# Patient Record
Sex: FEMALE | URBAN - METROPOLITAN AREA
[De-identification: names, ages, dates, MRNs, and addresses within clinical notes are randomized per-mention and may not be internally consistent; named-entity substitution may affect disease eponyms.]

---

## 2018-05-04 ENCOUNTER — OFFICE VISIT (OUTPATIENT)
Dept: OPHTHALMOLOGY | Facility: CLINIC | Age: 48
End: 2018-05-04
Payer: COMMERCIAL

## 2018-05-04 DIAGNOSIS — H52.13 MYOPIA, BILATERAL: Primary | ICD-10-CM

## 2018-05-04 DIAGNOSIS — G93.2 PSEUDOTUMOR CEREBRI: ICD-10-CM

## 2018-05-04 DIAGNOSIS — H47.10 EDEMA OF OPTIC NERVE: ICD-10-CM

## 2018-05-04 DIAGNOSIS — E11.9 TYPE 2 DIABETES MELLITUS WITHOUT OPHTHALMIC MANIFESTATIONS (H): ICD-10-CM

## 2018-05-04 RX ORDER — ATENOLOL 25 MG/1
25 TABLET ORAL DAILY
COMMUNITY
Start: 2016-12-23

## 2018-05-04 RX ORDER — ACETAZOLAMIDE 500 MG/1
500 CAPSULE, EXTENDED RELEASE ORAL DAILY
COMMUNITY
Start: 2016-11-11

## 2018-05-04 RX ORDER — BUPROPION HYDROCHLORIDE 300 MG/1
300 TABLET ORAL DAILY
COMMUNITY
Start: 2016-12-23 | End: 2018-07-20

## 2018-05-04 ASSESSMENT — CUP TO DISC RATIO
OS_RATIO: 0.1
OD_RATIO: 0.1

## 2018-05-04 ASSESSMENT — SLIT LAMP EXAM - LIDS
COMMENTS: NORMAL
COMMENTS: NORMAL

## 2018-05-04 ASSESSMENT — VISUAL ACUITY
OD_SC: 20/20
METHOD: SNELLEN - LINEAR
OS_SC: 20/30
OS_SC+: -2/+2
OS_SC: J5
OD_SC: J2

## 2018-05-04 ASSESSMENT — REFRACTION_MANIFEST
OS_SPHERE: -1.00
OD_SPHERE: -1.00
OD_ADD: +1.75
OS_CYLINDER: +1.00
OS_AXIS: 095
OS_ADD: +1.75
OD_CYLINDER: +0.75
OD_AXIS: 085

## 2018-05-04 ASSESSMENT — TONOMETRY
IOP_METHOD: APPLANATION
OD_IOP_MMHG: 16
OS_IOP_MMHG: 18

## 2018-05-04 ASSESSMENT — EXTERNAL EXAM - RIGHT EYE: OD_EXAM: NORMAL

## 2018-05-04 ASSESSMENT — CONF VISUAL FIELD
OD_NORMAL: 1
OS_NORMAL: 1
METHOD: COUNTING FINGERS

## 2018-05-04 ASSESSMENT — EXTERNAL EXAM - LEFT EYE: OS_EXAM: NORMAL

## 2018-05-04 NOTE — MR AVS SNAPSHOT
After Visit Summary   2018    Maritza Corral    MRN: [id number]           Patient Information     Date Of Birth          1970        Visit Information        Provider Department      2018 10:40 AM Lisa England MD [facility]        Today's Diagnoses     Myopia, bilateral - Both Eyes    -  1    Pseudotumor cerebri - Left Eye        Type 2 diabetes mellitus without ophthalmic manifestations (H) - Both Eyes           Follow-ups after your visit        Follow-up notes from your care team     Return in about 2 months (around 2018) for Follow Up- pseudotumor, review records from prior provider.  iop check .      Your next 10 appointments already scheduled     2018 10:40 AM CDT   Return Visit with Lisa England MD   [facility] ([facility])    [facility]  [address] [id number] [phone]              Who to contact     Please call your clinic at [phone] to:    Ask questions about your health    Make or cancel appointments    Discuss your medicines    Learn about your test results    Speak to your doctor            Additional Information About Your Visit        MyChart Information     [employer] Dail[employer]hart is an electronic gateway that provides easy, online access to your medical records. With Centrify, you can request a clinic appointment, read your test results, renew a prescription or communicate with your care team.     To sign up for Marvint visit the website at [email or web address]   You will be asked to enter the access code listed below, as well as some personal information. Please follow the directions to create your username and password.     Your access code is: [email or web address]  Expires: 2018  6:30 AM     Your access code will  in 90 days. If you need help or a new code, please contact your [facility] or call [phone] for assistance.        Care EveryWhere ID     This is your Care EveryWhere ID. This could be used by other organizations to access your [place] medical records  [id number]         Blood Pressure from Last 3 Encounters:   No data found for BP    Weight from Last 3 Encounters:   No data found for Wt              Today, you had the following     No orders found for display       Primary Care Provider Fax #    Physician No Ref-Primary [phone]       No address on file        Equal Access to Services     JEMAL MARTINEZ : Hadii aad ku hadasho Soomaali, waaxda luqadaha, qaybta kaalmada adeegyada, waxay idiin hayaan adeeg cristinacandida lacrystal . So [facility] [phone].    ATENCIÓN: Si habla español, tiene a mas disposición servicios gratuitos de asistencia lingüística. [facility] [phone].    We comply with applicable federal civil rights laws and [place] laws. We do not discriminate on the basis of race, color, national origin, age, disability, sex, sexual orientation, or gender identity.            Thank you!     Thank you for choosing [facility]  for your care. Our goal is always to provide you with excellent care. Hearing back from our patients is one way we can continue to improve our services. Please take a few minutes to complete the written survey that you may receive in the mail after your visit with us. Thank you!             Your Updated Medication List - Protect others around you: Learn how to safely use, store and throw away your medicines at www.disposemymeds.org.          This list is accurate as of 5/4/18 12:57 PM.  Always use your most recent med list.                   Brand Name Dispense Instructions for use Diagnosis    acetaZOLAMIDE 500 MG 12 hr capsule    DIAMOX SEQUEL     Take 500 mg by mouth daily        atenolol 25 MG tablet    TENORMIN     Take 25 mg by mouth daily        buPROPion 300 MG 24 hr tablet    WELLBUTRIN XL     Take 300  mg by mouth daily

## 2018-05-04 NOTE — NURSING NOTE
Chief Complaints and History of Present Illnesses   Patient presents with     Annual Eye Exam     Patient states has DM 2 for a little bit over 10 years / states diagnosed with pseudotumor for about 4 to 5 years     HPI    Affected eye(s):  Both   Symptoms:     Blurred vision   Decreased vision   No distorted vision   Difficulty with reading   No difficulty watching television   No difficulty with driving   Able to meet visual needs of job   No double vision   No puffy eyes   No floaters   No flashes   No glare   No halos   No starbursts   No ghost images   No wandering eye   No crossed eye   No redness   No foreign body sensation   Tearing   Dryness   Itching   Burning   No photophobia   No eye discharge      Duration:  6 months   Frequency:  Constant       Do you have eye pain now?:  No      Comments:  Patient states has DM 2 for a little bit over 10 years. / Avg FBS had been in high 200's about at least a month ago; dropping now to about 70 - 150 for last 3 weeks. A1C checked 2 weeks / unknown results / Previous had been been between 10-12.   Was unable to afford diabetes meds well over 5 years. Previous only took on and off.  Started taking every day as instructed for last few months.   Pt working on getting covered by insurance for weight management program. States insurance will not pay for gastric bypass, even though patient cleared by surgeon.    States diagnosed with LE pseudotumor for about 4 to 5 years / on Diamox 500 daily / intermittent pressure sensation behind RE when looks to the right / had been taking BID up to a year ago. / Has had 2 LP's.  Lost some vision in LE with PTC onset, but some restored after about a year. Dr. Zuleta, treats PTC [facility] pt.  Due for yearly exam.   [facility]old for [facility] just needs otc readers.    Aissatou Oliva COT 11:01 AM 05/04/2018

## 2018-05-10 NOTE — PROGRESS NOTES
HPI  Maritza Corral is a 47 year old female here for dilated fundus exam for comprehensive eye exam.  Feels that vision is stable both eyes for a few years.  Denies headache, transient vision changes, or diplopia.  Average fasting blood sugar had been in high 200's about at least a month ago; dropping now to about 70 - 150 for last 3 weeks. A1C checked 2 weeks ago, unsure of results, previously had been 10-12%    PMH:  PTC s/p 2 LPs diagnosis in about 2013.  diabetes mellitus 2 since about 2008, has been off of medication for over 5 years due to cost.  Was cleared for gastric bypass but not covered by insurance   POH:  Glasses for reading only, vision loss left eye at onset of PTC with reported partial recover. no surgery, no trauma  Oc Meds:  Diamox 500mg daily  FH:  Denies any glaucoma, age related macular degeneration, or other known eye diseases       Assessment & Plan      (H52.13) Myopia, bilateral - Both Eyes  (primary encounter diagnosis)  Comment: mild prescription, similar visual acuity to without correction   Plan: declines prescription     (G93.2) Pseudotumor cerebri - Left Eye  Comment: vision loss at diagnosis left eye with residual visual field defect, no symptoms of active progression, need old records  Plan: OVF 24-2 Dynamic OU, OCT Optic Nerve RNFL         Spectralis OU (both eyes) see report, will compare when records arrived     (E11.9) Type 2 diabetes mellitus without ophthalmic manifestations (H) - Both Eyes  Comment: w/o retinopathy   No results found for: E8M-hpegpjjk high   Discussed the importance of tighter blood glucose, blood pressure, and cholesterol   control in the prevention of diabetic retinopathy.   Plan:  Recommend yearly dilated eye exam.     -----------------------------------------------------------------------------------    Patient disposition:   Return in about 2 months (around 7/4/2018) for Follow Up- pseudotumor, review records from prior provider.  iop check . Call for  sooner appointment as needed.    Complete documentation of historical and exam elements from today's encounter can be found in the full encounter summary report (not reduplicated in this progress note). I personally obtained the chief complaint(s) and history of present illness.  I have confirmed and edited as necessary the CC, HPI, PMH/PSH, social history, FMH, ROS, and exam/neuro findings as obtained by the technician or others. I have examined this patient myself and I personally viewed the image(s) and studies listed above and the documentation reflects my findings and interpretation.  I formulated and edited as necessary the assessment and plan and discussed the findings and management plan with the patient and family.     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

## 2018-07-20 ENCOUNTER — OFFICE VISIT (OUTPATIENT)
Dept: OPHTHALMOLOGY | Facility: CLINIC | Age: 48
End: 2018-07-20
Payer: COMMERCIAL

## 2018-07-20 DIAGNOSIS — G93.2 PSEUDOTUMOR CEREBRI: Primary | ICD-10-CM

## 2018-07-20 DIAGNOSIS — E11.9 TYPE 2 DIABETES MELLITUS WITHOUT OPHTHALMIC MANIFESTATIONS (H): ICD-10-CM

## 2018-07-20 RX ORDER — ACETAZOLAMIDE 500 MG/1
500 CAPSULE, EXTENDED RELEASE ORAL 2 TIMES DAILY
Qty: 60 CAPSULE | Refills: 11 | Status: SHIPPED | OUTPATIENT
Start: 2018-07-20

## 2018-07-20 ASSESSMENT — TONOMETRY
OD_IOP_MMHG: 21
OS_IOP_MMHG: 25
OD_IOP_MMHG: 18
IOP_METHOD: APPLANATION
OS_IOP_MMHG: 17
IOP_METHOD: TONOPEN

## 2018-07-20 ASSESSMENT — SLIT LAMP EXAM - LIDS
COMMENTS: NORMAL
COMMENTS: NORMAL

## 2018-07-20 ASSESSMENT — VISUAL ACUITY
METHOD: SNELLEN - LINEAR
OS_SC: 20/30
OS_SC+: +3
OD_SC: 20/20

## 2018-07-20 ASSESSMENT — EXTERNAL EXAM - RIGHT EYE: OD_EXAM: NORMAL

## 2018-07-20 ASSESSMENT — EXTERNAL EXAM - LEFT EYE: OS_EXAM: NORMAL

## 2018-07-20 ASSESSMENT — CUP TO DISC RATIO
OD_RATIO: 0.1
OS_RATIO: 0.1

## 2018-07-20 NOTE — PROGRESS NOTES
HPI  Maritza Corral is a 47 year old female here for follow-up PTC.  Ran out of diamox recnetly but feels that vision is stable both eyes now and for a few years.  Denies headache, transient vision changes, or diplopia since running out of diamox.  A1C has been high and has not lost weight recently.     PMH:  PTC s/p 2 LPs diagnosis in about 2013.  diabetes mellitus 2 since about 2008, has been off of medication for over 5 years due to cost.  Was cleared for gastric bypass but not covered by insurance   POH:  Glasses for reading only, vision loss left eye at onset of PTC with reported partial recovery. no surgery, no trauma  Oc Meds:  Diamox- ran out  FH:  Denies any glaucoma, age related macular degeneration, or other known eye diseases       Assessment & Plan      (G93.2) Pseudotumor cerebri - Left Eye  (primary encounter diagnosis)  Comment: vision loss at diagnosis left eye with residual visual field defect, no symptoms of active progression, need old records- second request sent to    Plan: acetaZOLAMIDE (DIAMOX SEQUEL) 500 MG 12 hr         Capsule- resume until records reviewed.      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   Plan:  Recommend yearly dilated eye exam.     -----------------------------------------------------------------------------------    Patient disposition:   Return in about 4 months (around 11/20/2018) for Follow Up- Pseudotumor. Call for sooner appointment as needed.    Complete documentation of historical and exam elements from today's encounter can be found in the full encounter summary report (not reduplicated in this progress note). I personally obtained the chief complaint(s) and history of present illness.  I  have confirmed and edited as necessary the CC, HPI, PMH/PSH, social history, FMH, ROS, and exam/neuro findings as obtained by the technician or others. I have examined this patient myself and I personally viewed the image(s) and studies listed above and the documentation reflects my findings and interpretation.  I formulated and edited as necessary the assessment and plan and discussed the findings and management plan with the patient and family.     Lisa Egnland MD

## 2018-07-20 NOTE — MR AVS SNAPSHOT
After Visit Summary   7/20/2018    Maritza Corral    MRN: [id number]           Patient Information     Date Of Birth          1970        Visit Information        Provider Department      7/20/2018 10:40 AM Lisa England MD [facility]        Today's Diagnoses     Pseudotumor cerebri - Left Eye    -  1    Type 2 diabetes mellitus without ophthalmic manifestations (H) - Both Eyes           Follow-ups after your visit        Follow-up notes from your care team     Return in about 4 months (around 11/20/2018) for Follow Up- Pseudotumor.      Your next 10 appointments already scheduled     Jan 16, 2019 11:00 AM CST   Return Visit with Lisa England MD   [facility]    [facility]  [address] [id number] [phone]              Who to contact     Please call your clinic at [phone] to:    Ask questions about your health    Make or cancel appointments    Discuss your medicines    Learn about your test results    Speak to your doctor            Additional Information About Your Visit        Care EveryWhere ID     This is your Care EveryWhere ID. This could be used by other organizations to access your [place] medical records  [id number]         Blood Pressure from Last 3 Encounters:   No data found for BP    Weight from Last 3 Encounters:   No data found for Wt              Today, you had the following     No orders found for display         Today's Medication Changes          These changes are accurate as of 7/20/18  1:45 PM.  If you have any questions, ask your nurse or doctor.               These medicines have changed or have updated prescriptions.        Dose/Directions    * acetaZOLAMIDE 500 MG 12 hr capsule   Commonly known as:  DIAMOX SEQUEL   This may have changed:  Another medication with the same name was added. Make sure you  understand how and when to take each.   Changed by:  Lisa England MD        Dose:  500 mg   Take 500 mg by mouth daily   Refills:  0       * acetaZOLAMIDE 500 MG 12 hr capsule   Commonly known as:  DIAMOX SEQUEL   This may have changed:  You were already taking a medication with the same name, and this prescription was added. Make sure you understand how and when to take each.   Used for:  Pseudotumor cerebri   Changed by:  Lisa England MD        Dose:  500 mg   Take 1 capsule (500 mg) by mouth 2 times daily   Quantity:  60 capsule   Refills:  11       * Notice:  This list has 2 medication(s) that are the same as other medications prescribed for you. Read the directions carefully, and ask your doctor or other care provider to review them with you.      Stop taking these medicines if you haven't already. Please contact your care team if you have questions.     buPROPion 300 MG 24 hr tablet   Commonly known as:  WELLBUTRIN XL   Stopped by:  Lisa England MD                Where to get your medicines      These medications were sent to [facility] [address] [place] [address]     Phone:  [phone]     acetaZOLAMIDE 500 MG 12 hr capsule                Primary Care Provider Fax #    Physician No Ref-Primary [phone]       No address on file        Equal Access to Services     [facility]: Hadii ike strangeo Soantonina, waaxda luqadaha, qaybta kaalmada adeegyada, belen betancourt . [facility] [phone].    ATENCIÓN: Si habla español, tiene a mas disposición servicios gratuitos de asistencia lingüística. Llame al [phone].    We comply with applicable federal civil rights laws and [place] laws. We do not discriminate on the basis of race, color, national origin, age, disability, sex, sexual orientation, or gender identity.            Thank you!     Thank you for choosing  [facility]  [facility] your [facility]. Our goal is always to provide you with excellent care. Hearing back from our patients is one way we can continue to improve our services. Please take a few minutes to complete the written survey that you may receive in the mail after your visit with us. Thank you!             Your Updated Medication List - Protect others around you: Learn how to safely use, store and throw away your medicines at www.disposemymeds.org.          This list is accurate as of 7/20/18  1:45 PM.  Always use your most recent med list.                   Brand Name Dispense Instructions for use Diagnosis    * acetaZOLAMIDE 500 MG 12 hr capsule    DIAMOX SEQUEL     Take 500 mg by mouth daily        * acetaZOLAMIDE 500 MG 12 hr capsule    DIAMOX SEQUEL    60 capsule    Take 1 capsule (500 mg) by mouth 2 times daily    Pseudotumor cerebri       atenolol 25 MG tablet    TENORMIN     Take 25 mg by mouth daily        UNABLE TO FIND      Two types of unknown insulins which patient draws up / states 'the n and the r'        * Notice:  This list has 2 medication(s) that are the same as other medications prescribed for you. Read the directions carefully, and ask your doctor or other care provider to review them with you.

## 2018-07-23 ENCOUNTER — TELEPHONE (OUTPATIENT)
Dept: OPHTHALMOLOGY | Facility: CLINIC | Age: 48
End: 2018-07-23

## 2018-07-23 NOTE — TELEPHONE ENCOUNTER
MINOR sent today to [facility] for record release from [facility].  Was sent in May 2018, but no records received.  Release in Media; was printed and re sent today.    Aissatou SOW 1:35 PM 07/23/2018